# Patient Record
Sex: MALE | Race: WHITE | NOT HISPANIC OR LATINO | Employment: PART TIME | URBAN - METROPOLITAN AREA
[De-identification: names, ages, dates, MRNs, and addresses within clinical notes are randomized per-mention and may not be internally consistent; named-entity substitution may affect disease eponyms.]

---
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
## 2024-03-26 ENCOUNTER — APPOINTMENT (OUTPATIENT)
Dept: CT IMAGING | Facility: CLINIC | Age: 19
End: 2024-03-26
Attending: EMERGENCY MEDICINE
Payer: COMMERCIAL

## 2024-03-26 ENCOUNTER — HOSPITAL ENCOUNTER (EMERGENCY)
Facility: CLINIC | Age: 19
Discharge: HOME | End: 2024-03-26
Attending: EMERGENCY MEDICINE | Admitting: EMERGENCY MEDICINE
Payer: COMMERCIAL

## 2024-03-26 VITALS
DIASTOLIC BLOOD PRESSURE: 49 MMHG | RESPIRATION RATE: 18 BRPM | HEART RATE: 68 BPM | TEMPERATURE: 97.8 F | SYSTOLIC BLOOD PRESSURE: 109 MMHG | WEIGHT: 180 LBS | OXYGEN SATURATION: 94 %

## 2024-03-26 DIAGNOSIS — R10.33 PERIUMBILICAL ABDOMINAL PAIN: ICD-10-CM

## 2024-03-26 LAB
ALBUMIN SERPL BCG-MCNC: 4.8 G/DL (ref 3.5–5.2)
ALP SERPL-CCNC: 87 U/L (ref 40–150)
ALT SERPL W P-5'-P-CCNC: 29 U/L (ref 0–70)
ANION GAP SERPL CALCULATED.3IONS-SCNC: 11 MMOL/L (ref 7–15)
AST SERPL W P-5'-P-CCNC: 20 U/L (ref 0–45)
BASOPHILS # BLD AUTO: 0 10E3/UL (ref 0–0.2)
BASOPHILS NFR BLD AUTO: 0 %
BILIRUB SERPL-MCNC: 0.4 MG/DL
BUN SERPL-MCNC: 10.2 MG/DL (ref 6–20)
CALCIUM SERPL-MCNC: 9.9 MG/DL (ref 8.6–10)
CHLORIDE SERPL-SCNC: 103 MMOL/L (ref 98–107)
CREAT SERPL-MCNC: 0.8 MG/DL (ref 0.67–1.17)
DEPRECATED HCO3 PLAS-SCNC: 25 MMOL/L (ref 22–29)
EGFRCR SERPLBLD CKD-EPI 2021: >90 ML/MIN/1.73M2
EOSINOPHIL # BLD AUTO: 0 10E3/UL (ref 0–0.7)
EOSINOPHIL NFR BLD AUTO: 0 %
ERYTHROCYTE [DISTWIDTH] IN BLOOD BY AUTOMATED COUNT: 11.6 % (ref 10–15)
GLUCOSE SERPL-MCNC: 86 MG/DL (ref 70–99)
HCT VFR BLD AUTO: 44.2 % (ref 40–53)
HGB BLD-MCNC: 15.1 G/DL (ref 13.3–17.7)
IMM GRANULOCYTES # BLD: 0 10E3/UL
IMM GRANULOCYTES NFR BLD: 0 %
LIPASE SERPL-CCNC: 12 U/L (ref 13–60)
LYMPHOCYTES # BLD AUTO: 2.2 10E3/UL (ref 0.8–5.3)
LYMPHOCYTES NFR BLD AUTO: 22 %
MCH RBC QN AUTO: 30.2 PG (ref 26.5–33)
MCHC RBC AUTO-ENTMCNC: 34.2 G/DL (ref 31.5–36.5)
MCV RBC AUTO: 88 FL (ref 78–100)
MONOCYTES # BLD AUTO: 0.5 10E3/UL (ref 0–1.3)
MONOCYTES NFR BLD AUTO: 5 %
NEUTROPHILS # BLD AUTO: 6.8 10E3/UL (ref 1.6–8.3)
NEUTROPHILS NFR BLD AUTO: 73 %
NRBC # BLD AUTO: 0 10E3/UL
NRBC BLD AUTO-RTO: 0 /100
PLATELET # BLD AUTO: 308 10E3/UL (ref 150–450)
POTASSIUM SERPL-SCNC: 4.1 MMOL/L (ref 3.4–5.3)
PROT SERPL-MCNC: 7.9 G/DL (ref 6.4–8.3)
RBC # BLD AUTO: 5 10E6/UL (ref 4.4–5.9)
SODIUM SERPL-SCNC: 139 MMOL/L (ref 135–145)
WBC # BLD AUTO: 9.6 10E3/UL (ref 4–11)

## 2024-03-26 PROCEDURE — 250N000011 HC RX IP 250 OP 636: Performed by: EMERGENCY MEDICINE

## 2024-03-26 PROCEDURE — 96374 THER/PROPH/DIAG INJ IV PUSH: CPT | Mod: 59

## 2024-03-26 PROCEDURE — 99285 EMERGENCY DEPT VISIT HI MDM: CPT | Mod: 25

## 2024-03-26 PROCEDURE — 85025 COMPLETE CBC W/AUTO DIFF WBC: CPT | Performed by: EMERGENCY MEDICINE

## 2024-03-26 PROCEDURE — 80053 COMPREHEN METABOLIC PANEL: CPT | Performed by: EMERGENCY MEDICINE

## 2024-03-26 PROCEDURE — 83690 ASSAY OF LIPASE: CPT | Performed by: EMERGENCY MEDICINE

## 2024-03-26 PROCEDURE — 74177 CT ABD & PELVIS W/CONTRAST: CPT

## 2024-03-26 PROCEDURE — 36415 COLL VENOUS BLD VENIPUNCTURE: CPT | Performed by: EMERGENCY MEDICINE

## 2024-03-26 RX ORDER — KETOROLAC TROMETHAMINE 15 MG/ML
15 INJECTION, SOLUTION INTRAMUSCULAR; INTRAVENOUS ONCE
Status: COMPLETED | OUTPATIENT
Start: 2024-03-26 | End: 2024-03-26

## 2024-03-26 RX ORDER — IOPAMIDOL 755 MG/ML
500 INJECTION, SOLUTION INTRAVASCULAR ONCE
Status: COMPLETED | OUTPATIENT
Start: 2024-03-26 | End: 2024-03-26

## 2024-03-26 RX ADMIN — IOPAMIDOL 91 ML: 755 INJECTION, SOLUTION INTRAVENOUS at 19:41

## 2024-03-26 RX ADMIN — KETOROLAC TROMETHAMINE 15 MG: 15 INJECTION, SOLUTION INTRAMUSCULAR; INTRAVENOUS at 19:28

## 2024-03-26 ASSESSMENT — COLUMBIA-SUICIDE SEVERITY RATING SCALE - C-SSRS
2. HAVE YOU ACTUALLY HAD ANY THOUGHTS OF KILLING YOURSELF IN THE PAST MONTH?: NO
6. HAVE YOU EVER DONE ANYTHING, STARTED TO DO ANYTHING, OR PREPARED TO DO ANYTHING TO END YOUR LIFE?: NO
1. IN THE PAST MONTH, HAVE YOU WISHED YOU WERE DEAD OR WISHED YOU COULD GO TO SLEEP AND NOT WAKE UP?: NO

## 2024-03-26 ASSESSMENT — ACTIVITIES OF DAILY LIVING (ADL)
ADLS_ACUITY_SCORE: 35
ADLS_ACUITY_SCORE: 33
ADLS_ACUITY_SCORE: 35

## 2024-03-26 NOTE — ED TRIAGE NOTES
Pt. Presents to ED with complaints of umbilical pain that started about 90 mins PTA. Pt. Reports he has a known umbilical hernia and it has not caused him problems until now. Here for a CT scan. AVSS on RA. Denies N/V/D.

## 2024-03-27 NOTE — ED PROVIDER NOTES
[facility] Provider Note  [facility] Emergency Department  8:44 PM  3/26/2024    Rao Clements  19 year oldmale    Chief Complaint   Patient presents with    Abdominal Pain       HPI:    Otherwise healthy 19-year-old male here with acute onset of periumbilical abdominal pain described as sharp.  No significant radiation.  This started about 90 minutes prior to arrival.  No associated fever, vomiting, diarrhea, dysuria frequency urgency.  No previous abdominal surgery.        Independent Historian:     None     Review of External Notes:     None           ROS: 10 point ROS completed and negative other than mentioned above        No past medical history on file.  No past surgical history on file.        Current Outpatient Medications   Medication Instructions    doxycycline hyclate (VIBRAMYCIN) 100 mg, Oral, 2 TIMES DAILY          No Known Allergies      Physical Exam  Vitals: /49   Pulse 76   Temp 97.8  F (36.6  C) (Temporal)   Resp 18   Wt 81.6 kg (180 lb)   SpO2 100%         HEENT:  mmm, no rhinorrhea  Neck: supple, no abnormal swelling  Lungs:  CTAB,  no resp distress  CV: rrr, no m/r/g, ppi  Abd: soft, nontender, nondistended, no rebound/masses/guarding/hsm  Ext: no peripheral edema  Skin: warm, dry, well perfused, no rashes/bruising/lesions on exposed skin  Neuro: alert, MAEE, no gross motor or sensory deficits, gait stable  Psych: Normal mood, normal affect      Labs and Imaging:    Labs Ordered and Resulted from Time of ED Arrival to Time of ED Departure   LIPASE - Abnormal       Result Value    Lipase 12 (*)    COMPREHENSIVE METABOLIC PANEL - Normal    Sodium 139      Potassium 4.1      Carbon Dioxide (CO2) 25      Anion Gap 11      Urea Nitrogen 10.2      Creatinine 0.80      GFR Estimate >90      Calcium 9.9      Chloride 103      Glucose 86      Alkaline Phosphatase 87      AST 20      ALT 29      Protein Total 7.9      Albumin 4.8      Bilirubin Total 0.4     CBC WITH PLATELETS AND DIFFERENTIAL     WBC Count 9.6      RBC Count 5.00      Hemoglobin 15.1      Hematocrit 44.2      MCV 88      MCH 30.2      MCHC 34.2      RDW 11.6      Platelet Count 308      % Neutrophils 73      % Lymphocytes 22      % Monocytes 5      % Eosinophils 0      % Basophils 0      % Immature Granulocytes 0      NRBCs per 100 WBC 0      Absolute Neutrophils 6.8      Absolute Lymphocytes 2.2      Absolute Monocytes 0.5      Absolute Eosinophils 0.0      Absolute Basophils 0.0      Absolute Immature Granulocytes 0.0      Absolute NRBCs 0.0            CT Abdomen Pelvis w Contrast   Final Result   IMPRESSION:    No acute findings or other explanation for abdominal pain.                   Independent Interpretation (X-rays, CTs, rhythm strip):    None      Consultations/Discussion of Management or Tests:    None     Social Determinants of Health affecting care:    None         ED Medications:   Medications   ketorolac (TORADOL) injection 15 mg (15 mg Intravenous $Given 3/26/24 1928)   iopamidol (ISOVUE-370) solution 500 mL (91 mLs Intravenous $Given 3/26/24 1941)   sodium chloride (PF) 0.9% PF flush 100 mL (63 mLs Intravenous $Given 3/26/24 1941)           ED Course:             Medical Decision Makin-year-old male here with periumbilical abdominal pain.  At work appears unremarkable for emergent surgical vascular or infectious etiologies.  Etiology unclear may be musculoskeletal may be functional however he has been risk stratified low enough where he can safely be discharged home and followed clinically returning with new or worsening symptoms.  He was comfortable and agreeable with that plan.  He understands what is known, what is unknown, what to watch out for and when to return here to the emergency department.      Diagnosis:    ICD-10-CM    1. Periumbilical abdominal pain  R10.33             Disposition:  home      Indio Thacker MD  [facility]  [facility]       Indio Thacker MD  24

## 2024-06-11 ENCOUNTER — HOSPITAL ENCOUNTER (EMERGENCY)
Facility: CLINIC | Age: 19
Discharge: HOME | End: 2024-06-11
Attending: EMERGENCY MEDICINE | Admitting: EMERGENCY MEDICINE
Payer: COMMERCIAL

## 2024-06-11 VITALS
DIASTOLIC BLOOD PRESSURE: 64 MMHG | OXYGEN SATURATION: 99 % | HEART RATE: 62 BPM | RESPIRATION RATE: 18 BRPM | TEMPERATURE: 98.6 F | SYSTOLIC BLOOD PRESSURE: 114 MMHG | WEIGHT: 184.3 LBS

## 2024-06-11 DIAGNOSIS — R55 SYNCOPE AND COLLAPSE: ICD-10-CM

## 2024-06-11 LAB
ANION GAP SERPL CALCULATED.3IONS-SCNC: 12 MMOL/L (ref 7–15)
BASOPHILS # BLD AUTO: 0 10E3/UL (ref 0–0.2)
BASOPHILS NFR BLD AUTO: 0 %
BUN SERPL-MCNC: 11.2 MG/DL (ref 6–20)
CALCIUM SERPL-MCNC: 9.7 MG/DL (ref 8.6–10)
CHLORIDE SERPL-SCNC: 102 MMOL/L (ref 98–107)
CREAT SERPL-MCNC: 0.84 MG/DL (ref 0.67–1.17)
DEPRECATED HCO3 PLAS-SCNC: 23 MMOL/L (ref 22–29)
EGFRCR SERPLBLD CKD-EPI 2021: >90 ML/MIN/1.73M2
EOSINOPHIL # BLD AUTO: 0 10E3/UL (ref 0–0.7)
EOSINOPHIL NFR BLD AUTO: 1 %
ERYTHROCYTE [DISTWIDTH] IN BLOOD BY AUTOMATED COUNT: 11.7 % (ref 10–15)
GLUCOSE SERPL-MCNC: 98 MG/DL (ref 70–99)
HCT VFR BLD AUTO: 45.1 % (ref 40–53)
HGB BLD-MCNC: 15.4 G/DL (ref 13.3–17.7)
IMM GRANULOCYTES # BLD: 0 10E3/UL
IMM GRANULOCYTES NFR BLD: 0 %
LYMPHOCYTES # BLD AUTO: 1.4 10E3/UL (ref 0.8–5.3)
LYMPHOCYTES NFR BLD AUTO: 27 %
MCH RBC QN AUTO: 30.3 PG (ref 26.5–33)
MCHC RBC AUTO-ENTMCNC: 34.1 G/DL (ref 31.5–36.5)
MCV RBC AUTO: 89 FL (ref 78–100)
MONOCYTES # BLD AUTO: 0.6 10E3/UL (ref 0–1.3)
MONOCYTES NFR BLD AUTO: 11 %
NEUTROPHILS # BLD AUTO: 3.2 10E3/UL (ref 1.6–8.3)
NEUTROPHILS NFR BLD AUTO: 61 %
NRBC # BLD AUTO: 0 10E3/UL
NRBC BLD AUTO-RTO: 0 /100
PLATELET # BLD AUTO: 260 10E3/UL (ref 150–450)
POTASSIUM SERPL-SCNC: 4.2 MMOL/L (ref 3.4–5.3)
RBC # BLD AUTO: 5.08 10E6/UL (ref 4.4–5.9)
SODIUM SERPL-SCNC: 137 MMOL/L (ref 135–145)
TROPONIN T SERPL HS-MCNC: <6 NG/L
WBC # BLD AUTO: 5.2 10E3/UL (ref 4–11)

## 2024-06-11 PROCEDURE — 85025 COMPLETE CBC W/AUTO DIFF WBC: CPT | Performed by: EMERGENCY MEDICINE

## 2024-06-11 PROCEDURE — 80048 BASIC METABOLIC PNL TOTAL CA: CPT | Performed by: EMERGENCY MEDICINE

## 2024-06-11 PROCEDURE — 84484 ASSAY OF TROPONIN QUANT: CPT | Performed by: EMERGENCY MEDICINE

## 2024-06-11 PROCEDURE — 99284 EMERGENCY DEPT VISIT MOD MDM: CPT

## 2024-06-11 PROCEDURE — 93005 ELECTROCARDIOGRAM TRACING: CPT

## 2024-06-11 PROCEDURE — 36415 COLL VENOUS BLD VENIPUNCTURE: CPT | Performed by: EMERGENCY MEDICINE

## 2024-06-11 PROCEDURE — 250N000013 HC RX MED GY IP 250 OP 250 PS 637: Performed by: EMERGENCY MEDICINE

## 2024-06-11 RX ORDER — ACETAMINOPHEN 500 MG
1000 TABLET ORAL ONCE
Status: COMPLETED | OUTPATIENT
Start: 2024-06-11 | End: 2024-06-11

## 2024-06-11 RX ORDER — MAGNESIUM HYDROXIDE/ALUMINUM HYDROXICE/SIMETHICONE 120; 1200; 1200 MG/30ML; MG/30ML; MG/30ML
15 SUSPENSION ORAL ONCE
Status: COMPLETED | OUTPATIENT
Start: 2024-06-11 | End: 2024-06-11

## 2024-06-11 RX ADMIN — ALUMINUM HYDROXIDE, MAGNESIUM HYDROXIDE, AND SIMETHICONE 15 ML: 1200; 120; 1200 SUSPENSION ORAL at 15:01

## 2024-06-11 RX ADMIN — ACETAMINOPHEN 1000 MG: 500 TABLET, FILM COATED ORAL at 15:01

## 2024-06-11 ASSESSMENT — COLUMBIA-SUICIDE SEVERITY RATING SCALE - C-SSRS
6. HAVE YOU EVER DONE ANYTHING, STARTED TO DO ANYTHING, OR PREPARED TO DO ANYTHING TO END YOUR LIFE?: NO
2. HAVE YOU ACTUALLY HAD ANY THOUGHTS OF KILLING YOURSELF IN THE PAST MONTH?: NO
1. IN THE PAST MONTH, HAVE YOU WISHED YOU WERE DEAD OR WISHED YOU COULD GO TO SLEEP AND NOT WAKE UP?: NO

## 2024-06-11 ASSESSMENT — ACTIVITIES OF DAILY LIVING (ADL)
ADLS_ACUITY_SCORE: 35
ADLS_ACUITY_SCORE: 35

## 2024-06-11 NOTE — ED TRIAGE NOTES
Patient states that about 3am he was up to use the bathroom, standing to pee and passed out, woke up on the ground shortly after. Now c/o headache and chest pain/SOB. Head and chest pain have increased. ABCs intact. VSS.

## 2024-06-11 NOTE — ED PROVIDER NOTES
Emergency Department Note      History of Present Illness     Chief Complaint  Syncope and Chest Pain    HPI  Rao Clements is a 19 year old male who presents with his mother for evaluation of chest pain and syncopal episode. The patient reports that at 0300 this morning he went to use the bathroom after he had finished masturbating. States that he was standing at the toilet urinating he had lightheadedness and then remembers waking up on the floor. States that he took a shower and then went to sleep. Notes that when he woke up this morning he has a frontal headache and new epigastric abdominal pain. Adds that he has had chest pain for a few days.  He has not taken any pain medications at home. He denies previous syncopal episodes. He denies new medications, recent illnesses, and family history of syncopal episodes or sudden cardiac death.  Patient reports he may be dehydrated as he feels he does not drink enough fluids.    Independent Historian  None    Review of External Notes  None  Past Medical History   Medical History and Problem List  H. Pylori infection     Medications  The patient is currently on no regular medications.    Physical Exam   Patient Vitals for the past 24 hrs:   BP Temp Temp src Pulse Resp SpO2 Weight   06/11/24 1635 114/64 -- -- 62 18 99 % --   06/11/24 1326 120/63 98.6  F (37  C) Temporal 64 18 99 % 83.6 kg (184 lb 4.9 oz)     Physical Exam  General: Alert, no acute distress  HEENT:  Atraumatic. Moist mucous membranes. Conjunctiva normal. No meningismus.   CV:  RRR, no m/r/g, skin warm and well perfused  Pulm:  CTAB, no wheezes/ronchi/rales.  No acute distress, breathing comfortably  GI:  Soft, nontender, nondistended.  No rebound or guarding.    MSK:  Moving all extremities.  No focal areas of edema, erythema  Skin:  WWP, no rashes, no lower extremity edema, skin color normal, no diaphoresis  Psych:  Well-appearing, normal affect, regular speech     Diagnostics   Lab Results   Labs Ordered  and Resulted from Time of ED Arrival to Time of ED Departure   BASIC METABOLIC PANEL - Normal       Result Value    Sodium 137      Potassium 4.2      Chloride 102      Carbon Dioxide (CO2) 23      Anion Gap 12      Urea Nitrogen 11.2      Creatinine 0.84      GFR Estimate >90      Calcium 9.7      Glucose 98     TROPONIN T, HIGH SENSITIVITY - Normal    Troponin T, High Sensitivity <6     CBC WITH PLATELETS AND DIFFERENTIAL    WBC Count 5.2      RBC Count 5.08      Hemoglobin 15.4      Hematocrit 45.1      MCV 89      MCH 30.3      MCHC 34.1      RDW 11.7      Platelet Count 260      % Neutrophils 61      % Lymphocytes 27      % Monocytes 11      % Eosinophils 1      % Basophils 0      % Immature Granulocytes 0      NRBCs per 100 WBC 0      Absolute Neutrophils 3.2      Absolute Lymphocytes 1.4      Absolute Monocytes 0.6      Absolute Eosinophils 0.0      Absolute Basophils 0.0      Absolute Immature Granulocytes 0.0      Absolute NRBCs 0.0         Imaging  No orders to display     EKG   ECG taken at 1336, ECG read at 1342  Normal sinus rhythm    Rate 62 bpm. WV interval 158 ms. QRS duration 92 ms. QT/QTc 398/403 ms. P-R-T axes 63 68 57.    Independent Interpretation  None  ED Course    Medications Administered  Medications   alum & mag hydroxide-simethicone (MAALOX) suspension 15 mL (15 mLs Oral $Given 6/11/24 1501)   acetaminophen (TYLENOL) tablet 1,000 mg (1,000 mg Oral $Given 6/11/24 1501)       Procedures  Procedures     Discussion of Management  None    Social Determinants of Health adding to complexity of care  None    ED Course  ED Course as of 06/11/24 1948 Tue Jun 11, 2024   1447 I obtained history and examined the patient as noted above.    9439 I rechecked and updated the patient.      Medical Decision Making / Diagnosis   CMS Diagnoses: None    MIPS     None    [facility]  Rao Clements is a 19 year old male who presents to the emergency department for evaluation of syncopal episode at home.  Please see above  for details of HPI and exam.  Expresses mild chest pain over the last few days.  He is afebrile and vitally stable.  Screening EKG shows no acute ischemic appearing changes or signs of worrisome dysrhythmia.  No signs of WPW, Brugada, prolonged QT, signs of HOCM.  High-sensitivity troponin is undetectable despite days of ongoing symptoms.  I feel this rules out ACS.  Additionally, patient has a low HEART score.  Patient is low risk for PE and is PERC negative.  Doubt acute aortic dissection based on history and exam.  Lung fields are clear and he is not hypoxic or in respiratory distress, doubt pneumonia/pneumothorax.  The rest of his basic lab studies above show no severe electrolyte/metabolic derangement.  Overall suspect vasovagal syncope versus dehydration.  No red flag historical findings of exertional syncope or family history of SCD.  With reasonable clinical certainty, I do feel that the patient is safe to discharge home.  Recommend close follow-up with primary care doctor and he is agreeable with this.  Discussed return precautions for the emergency department.  All questions were answered prior to discharge.    Disposition  The patient was discharged.     ICD-10 Codes:    ICD-10-CM    1. Syncope and collapse  R55            Discharge Medications  Discharge Medication List as of 6/11/2024  4:29 PM        Scribe Disclosure:  I, Radha Tejeda, am serving as a scribe at 2:50 PM on 6/11/2024 to document services personally performed by Geremias Vinson MD based on my observations and the provider's statements to me.        Geremias Vinson MD  06/11/24 1950

## 2024-06-12 LAB
ATRIAL RATE - MUSE: 62 BPM
DIASTOLIC BLOOD PRESSURE - MUSE: NORMAL MMHG
INTERPRETATION ECG - MUSE: NORMAL
P AXIS - MUSE: 63 DEGREES
PR INTERVAL - MUSE: 158 MS
QRS DURATION - MUSE: 92 MS
QT - MUSE: 398 MS
QTC - MUSE: 403 MS
R AXIS - MUSE: 68 DEGREES
SYSTOLIC BLOOD PRESSURE - MUSE: NORMAL MMHG
T AXIS - MUSE: 57 DEGREES
VENTRICULAR RATE- MUSE: 62 BPM